# Patient Record
Sex: FEMALE | Race: WHITE | NOT HISPANIC OR LATINO | URBAN - METROPOLITAN AREA
[De-identification: names, ages, dates, MRNs, and addresses within clinical notes are randomized per-mention and may not be internally consistent; named-entity substitution may affect disease eponyms.]

---

## 2022-06-22 ENCOUNTER — OFFICE VISIT (OUTPATIENT)
Dept: URGENT CARE | Facility: CLINIC | Age: 62
End: 2022-06-22
Payer: COMMERCIAL

## 2022-06-22 VITALS
SYSTOLIC BLOOD PRESSURE: 151 MMHG | RESPIRATION RATE: 18 BRPM | DIASTOLIC BLOOD PRESSURE: 68 MMHG | TEMPERATURE: 97.1 F | HEART RATE: 55 BPM | BODY MASS INDEX: 21.34 KG/M2 | HEIGHT: 61 IN | OXYGEN SATURATION: 99 % | WEIGHT: 113 LBS
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
## 2022-06-22 PROCEDURE — G0382 LEV 3 HOSP TYPE B ED VISIT: HCPCS

## 2022-06-22 RX ORDER — CLOBETASOL PROPIONATE 0.5 MG/G
CREAM TOPICAL 2 TIMES DAILY
Qty: 15 G | Refills: 0 | Status: SHIPPED | OUTPATIENT
Start: 2022-06-22

## 2022-06-22 RX ORDER — HYDROXYZINE 50 MG/1
25 TABLET, FILM COATED ORAL 3 TIMES DAILY PRN
Qty: 30 TABLET | Refills: 0 | Status: SHIPPED | OUTPATIENT
Start: 2022-06-22

## 2022-06-22 NOTE — PROGRESS NOTES
[facility]        NAME: Lina Montgomery is a 63 y o  female  : 1960    MRN: [id number]  DATE: 2022  TIME: 5:14 PM    Assessment and Plan   Bug bite, initial encounter [W57  XXXA]  1  Bug bite, initial encounter  hydrOXYzine HCL (ATARAX) 50 mg tablet    clobetasol (TEMOVATE) 0 05 % cream     Will treat itching with Atarax  Will try steroid cream for bites not on face  Patient Instructions       Follow up with PCP in 3-5 days  Proceed to  ER if symptoms worsen  Chief Complaint     Chief Complaint   Patient presents with   [address]     Started about 5 days ago  Patient very itchy          History of Present Illness       Patient is a 80-year-old female presents to the office today for multiple bug bites  Patient states that she has been hiking this [id number] [address] from [facility] to [place] and has multiple areas of bug bites  She states that she cannot tolerate itchiness anymore which is aggravated by the heat and sweating  She states that being in the office helps with her itching as it is nice and cool  She is using bug spray in wearing long sleeves as well as long pants  She states that she did stay in the hostel 1 night in [place] and is concerned for bedbugs  She denies seeing any bugs or anyone else that states they are having any bites  Review of Systems   Review of Systems   Constitutional: Negative for chills, fatigue and fever  Gastrointestinal: Negative for diarrhea, nausea and vomiting  Skin: Negative for rash  Pruritus   All other systems reviewed and are negative          Current Medications       Current Outpatient Medications:     clobetasol (TEMOVATE) 0 05 % cream, Apply topically 2 (two) times a day, Disp: 15 g, Rfl: 0    hydrOXYzine HCL (ATARAX) 50 mg tablet, Take 0 5 tablets (25 mg total) by mouth 3 (three) times a day as needed for itching, Disp: 30 tablet, Rfl: 0    Current Allergies     Allergies as of 2022    (No Known Allergies)            The following portions of the patient's history were reviewed and updated as appropriate: allergies, current medications, past family history, past medical history, past social history, past surgical history and problem list      History reviewed  No pertinent past medical history  History reviewed  No pertinent surgical history  History reviewed  No pertinent family history  Medications have been verified  Objective   /68   Pulse 55   Temp (!) 97 1 °F (36 2 °C)   Resp 18   Ht 5' 1" (1 549 m)   Wt 51 3 kg (113 lb)   SpO2 99%   BMI 21 35 kg/m²        Physical Exam     Physical Exam  Vitals and nursing note reviewed  Constitutional:       General: She is not in acute distress  Appearance: Normal appearance  She is normal weight  She is not ill-appearing or toxic-appearing  Cardiovascular:      Rate and Rhythm: Normal rate and regular rhythm  Pulses: Normal pulses  Heart sounds: Normal heart sounds  Pulmonary:      Effort: Pulmonary effort is normal       Breath sounds: Normal breath sounds  Musculoskeletal:         General: Normal range of motion  Skin:     General: Skin is warm and dry  Capillary Refill: Capillary refill takes less than 2 seconds  Comments: Scattered bug bites across bilateral arms, back, chest and face  They appear to be mosquito bites  Neurological:      Mental Status: She is alert